# Patient Record
Sex: MALE | Race: WHITE | NOT HISPANIC OR LATINO | Employment: OTHER | URBAN - NONMETROPOLITAN AREA
[De-identification: names, ages, dates, MRNs, and addresses within clinical notes are randomized per-mention and may not be internally consistent; named-entity substitution may affect disease eponyms.]

---

## 2024-09-10 ENCOUNTER — HOSPITAL ENCOUNTER (EMERGENCY)
Facility: HOSPITAL | Age: 54
Discharge: HOME | End: 2024-09-10
Attending: STUDENT IN AN ORGANIZED HEALTH CARE EDUCATION/TRAINING PROGRAM
Payer: COMMERCIAL

## 2024-09-10 VITALS
BODY MASS INDEX: 23.62 KG/M2 | RESPIRATION RATE: 18 BRPM | SYSTOLIC BLOOD PRESSURE: 123 MMHG | DIASTOLIC BLOOD PRESSURE: 75 MMHG | WEIGHT: 165 LBS | OXYGEN SATURATION: 98 % | HEIGHT: 70 IN | TEMPERATURE: 98 F | HEART RATE: 89 BPM

## 2024-09-10 DIAGNOSIS — T78.40XA ALLERGIC REACTION, INITIAL ENCOUNTER: Primary | ICD-10-CM

## 2024-09-10 DIAGNOSIS — T63.461A WASP STING, ACCIDENTAL OR UNINTENTIONAL, INITIAL ENCOUNTER: ICD-10-CM

## 2024-09-10 PROCEDURE — 90715 TDAP VACCINE 7 YRS/> IM: CPT | Performed by: STUDENT IN AN ORGANIZED HEALTH CARE EDUCATION/TRAINING PROGRAM

## 2024-09-10 PROCEDURE — 99284 EMERGENCY DEPT VISIT MOD MDM: CPT | Mod: 25

## 2024-09-10 PROCEDURE — 96361 HYDRATE IV INFUSION ADD-ON: CPT

## 2024-09-10 PROCEDURE — 96374 THER/PROPH/DIAG INJ IV PUSH: CPT

## 2024-09-10 PROCEDURE — 90471 IMMUNIZATION ADMIN: CPT | Performed by: STUDENT IN AN ORGANIZED HEALTH CARE EDUCATION/TRAINING PROGRAM

## 2024-09-10 PROCEDURE — 63600175 PHARM REV CODE 636 W HCPCS: Performed by: STUDENT IN AN ORGANIZED HEALTH CARE EDUCATION/TRAINING PROGRAM

## 2024-09-10 RX ORDER — PREDNISONE 50 MG/1
50 TABLET ORAL DAILY
Qty: 5 TABLET | Refills: 0 | Status: SHIPPED | OUTPATIENT
Start: 2024-09-10 | End: 2024-09-15

## 2024-09-10 RX ORDER — EPINEPHRINE 0.3 MG/.3ML
1 INJECTION SUBCUTANEOUS
Qty: 1 EACH | Refills: 0 | Status: SHIPPED | OUTPATIENT
Start: 2024-09-10 | End: 2025-09-10

## 2024-09-10 RX ORDER — KETOROLAC TROMETHAMINE 30 MG/ML
15 INJECTION, SOLUTION INTRAMUSCULAR; INTRAVENOUS
Status: COMPLETED | OUTPATIENT
Start: 2024-09-10 | End: 2024-09-10

## 2024-09-10 RX ORDER — CLINDAMYCIN HYDROCHLORIDE 150 MG/1
450 CAPSULE ORAL EVERY 8 HOURS
Qty: 63 CAPSULE | Refills: 0 | Status: SHIPPED | OUTPATIENT
Start: 2024-09-10 | End: 2024-09-17

## 2024-09-10 RX ADMIN — TETANUS TOXOID, REDUCED DIPHTHERIA TOXOID AND ACELLULAR PERTUSSIS VACCINE, ADSORBED 0.5 ML: 5; 2.5; 8; 8; 2.5 SUSPENSION INTRAMUSCULAR at 08:09

## 2024-09-10 RX ADMIN — SODIUM CHLORIDE, POTASSIUM CHLORIDE, SODIUM LACTATE AND CALCIUM CHLORIDE 1000 ML: 600; 310; 30; 20 INJECTION, SOLUTION INTRAVENOUS at 08:09

## 2024-09-10 RX ADMIN — KETOROLAC TROMETHAMINE 15 MG: 30 INJECTION, SOLUTION INTRAMUSCULAR at 07:09

## 2024-09-11 NOTE — DISCHARGE INSTRUCTIONS
Take antibiotics for infection prevention.  Steroids also given to 10 per down response from wasp sting.  You may use ibuprofen as needed for pain control.  A prescription for EpiPen was given in the event that you have acute anaphylactic like allergic reaction.  Add over-the-counter Pepcid for additional relief.  Continue use of your daily Claritin.

## 2024-09-11 NOTE — ED PROVIDER NOTES
"  History  Chief Complaint   Patient presents with    Allergic Reaction     Pt to ED via EMS - patient was stung by a wasp on left hand - began developing shortness or breath and muscle cramping. EMS reports supine on ground, reddened skin, and diaphoresis on contact. 0.3mg 1:1,00 IM Epi and 50mg IV Benadryl given at approximately 18:43. Improvement noted with treatment. Patient reports no shortness of breath or cramping on arrival. Skin color, temp, and condition appear normal.        53-year-old male presents for evaluation of allergic reaction patient reports he was stung by a wasp in his left hand near the pinky.  Shortly thereafter patient began to experience swelling around the site associated with shortness of breath and muscle cramping.  Benadryl take an for symptom relief with minimal improvement.  Patient subsequently called EMS as he felt as though he was going to pass out.  Patient given additional Benadryl and epi at 6:43 p.m..  Patient notes significant improvement.  Does endorse soreness of the hand but no shortness of breath, nausea, vomiting or skin site changes reported at this time.        History reviewed. No pertinent past medical history.    History reviewed. No pertinent surgical history.    No family history on file.    Social History     Tobacco Use    Smoking status: Unknown       ROS  Review of Systems   Musculoskeletal:  Positive for arthralgias.   Skin:  Positive for color change.       Physical Exam  /75   Pulse 89   Temp 98.3 °F (36.8 °C)   Resp 18   Ht 5' 10" (1.778 m)   Wt 74.8 kg (165 lb)   SpO2 98%   BMI 23.68 kg/m²   Physical Exam    Constitutional: He appears well-developed and well-nourished. He is cooperative.   HENT:   Head: Normocephalic and atraumatic.   Eyes: Conjunctivae, EOM and lids are normal. Pupils are equal, round, and reactive to light.   Neck: Phonation normal.   Normal range of motion.  Cardiovascular:  Normal rate, regular rhythm and intact distal " pulses.           Musculoskeletal:      Left hand: Swelling and tenderness present. Normal strength. Normal sensation. Normal pulse.        Hands:       Cervical back: Normal range of motion.      Comments: Circled area shows sting site.  Erythema noted with mild swelling of the anterior aspect.  Full range of motion noted to the hand.  Distal pulses intact.     Neurological: He is alert and oriented to person, place, and time.   Skin: Skin is warm and dry.   Psychiatric: He has a normal mood and affect. His speech is normal and behavior is normal.               Labs Reviewed - No data to display        Imaging Results    None                     Procedures             Medical Decision Making  Patient presents for evaluation of allergic reaction.  Medication given outpatient setting with significant improvement.  Given patient received epinephrine monitored ER.  Will give fluids and medicine cramping.    Risk  Prescription drug management.               ED Course as of 09/10/24 2356   Tue Sep 10, 2024   0238 Patient evaluated during course of ER stay over 3 hours.  Patient noted no symptom worsening and actual continued symptom improvement.  By end of visit hand no longer observed to be erythematous.  Range of motion remains normal.  Will give prescription for antibiotics for infection prevention.  Will give prescription for steroids for 5 days.  Will give prescription for EpiPen.  Advised patient to use over-the-counter Claritin and daily Pepcid.  Patient should follow up with PMD for re-evaluation in the outpatient setting as needed.  Return precautions were given. [NA]      ED Course User Index  [NA] Elign Rouse MD       DISCHARGE NOTE:       Bebeto Terry's  results have been reviewed with him.  He has been counseled regarding his diagnosis, treatment, and plan.  He verbally conveys understanding and agreement of the signs, symptoms, diagnosis, treatment and prognosis and additionally agrees to follow  up as discussed.  He also agrees with the care-plan and conveys that all of his questions have been answered.  I have also provided discharge instructions for him that include: educational information regarding their diagnosis and treatment, and list of reasons why they would want to return to the ED prior to their follow-up appointment, should his condition change. He has been provided with education for proper emergency department utilization.      CLINICAL IMPRESSION:         1. Allergic reaction, initial encounter    2. Wasp sting, accidental or unintentional, initial encounter              PLAN:   1. Discharge  2.   Discharge Medication List as of 9/10/2024 10:16 PM        START taking these medications    Details   clindamycin (CLEOCIN) 150 MG capsule Take 3 capsules (450 mg total) by mouth every 8 (eight) hours. for 7 days, Starting Tue 9/10/2024, Until Tue 9/17/2024, Print      EPINEPHrine (EPIPEN) 0.3 mg/0.3 mL AtIn Inject 0.3 mLs (0.3 mg total) into the muscle as needed., Starting Tue 9/10/2024, Until Wed 9/10/2025 at 2359, Print      predniSONE (DELTASONE) 50 MG Tab Take 1 tablet (50 mg total) by mouth once daily. for 5 days, Starting Tue 9/10/2024, Until Sun 9/15/2024, Print           3. Mirza Ashraf [place]  [id number] [address] [address]  [phone]    Schedule an appointment as soon as possible for a visit in 2 days            Elgin Rouse MD  09/10/24 [id number]